# Patient Record
Sex: FEMALE | Race: BLACK OR AFRICAN AMERICAN
[De-identification: names, ages, dates, MRNs, and addresses within clinical notes are randomized per-mention and may not be internally consistent; named-entity substitution may affect disease eponyms.]

---

## 2019-03-30 ENCOUNTER — HOSPITAL ENCOUNTER (EMERGENCY)
Dept: HOSPITAL 61 - ER | Age: 74
Discharge: HOME | End: 2019-03-30
Payer: COMMERCIAL

## 2019-03-30 VITALS — BODY MASS INDEX: 26.93 KG/M2 | HEIGHT: 63 IN | WEIGHT: 152 LBS

## 2019-03-30 VITALS — DIASTOLIC BLOOD PRESSURE: 79 MMHG | SYSTOLIC BLOOD PRESSURE: 184 MMHG

## 2019-03-30 DIAGNOSIS — E11.42: Primary | ICD-10-CM

## 2019-03-30 DIAGNOSIS — M79.641: ICD-10-CM

## 2019-03-30 DIAGNOSIS — I10: ICD-10-CM

## 2019-03-30 DIAGNOSIS — Z90.49: ICD-10-CM

## 2019-03-30 LAB
ANION GAP SERPL CALC-SCNC: 12 MMOL/L (ref 6–14)
BASOPHILS # BLD AUTO: 0 X10^3/UL (ref 0–0.2)
BASOPHILS NFR BLD: 0 % (ref 0–3)
BUN SERPL-MCNC: 19 MG/DL (ref 7–20)
CALCIUM SERPL-MCNC: 8.8 MG/DL (ref 8.5–10.1)
CHLORIDE SERPL-SCNC: 102 MMOL/L (ref 98–107)
CO2 SERPL-SCNC: 29 MMOL/L (ref 21–32)
CREAT SERPL-MCNC: 1.4 MG/DL (ref 0.6–1)
EOSINOPHIL NFR BLD: 0.1 X10^3/UL (ref 0–0.7)
EOSINOPHIL NFR BLD: 2 % (ref 0–3)
ERYTHROCYTE [DISTWIDTH] IN BLOOD BY AUTOMATED COUNT: 13.8 % (ref 11.5–14.5)
GFR SERPLBLD BASED ON 1.73 SQ M-ARVRAT: 44.5 ML/MIN
GLUCOSE SERPL-MCNC: 229 MG/DL (ref 70–99)
HCT VFR BLD CALC: 35.9 % (ref 36–47)
HGB BLD-MCNC: 11.4 G/DL (ref 12–15.5)
LYMPHOCYTES # BLD: 1.6 X10^3/UL (ref 1–4.8)
LYMPHOCYTES NFR BLD AUTO: 37 % (ref 24–48)
MCH RBC QN AUTO: 27 PG (ref 25–35)
MCHC RBC AUTO-ENTMCNC: 32 G/DL (ref 31–37)
MCV RBC AUTO: 83 FL (ref 79–100)
MONO #: 0.3 X10^3/UL (ref 0–1.1)
MONOCYTES NFR BLD: 8 % (ref 0–9)
NEUT #: 2.2 X10^3UL (ref 1.8–7.7)
NEUTROPHILS NFR BLD AUTO: 52 % (ref 31–73)
PLATELET # BLD AUTO: 212 X10^3/UL (ref 140–400)
POTASSIUM SERPL-SCNC: 3.3 MMOL/L (ref 3.5–5.1)
RBC # BLD AUTO: 4.31 X10^6/UL (ref 3.5–5.4)
SODIUM SERPL-SCNC: 143 MMOL/L (ref 136–145)
WBC # BLD AUTO: 4.2 X10^3/UL (ref 4–11)

## 2019-03-30 PROCEDURE — 80048 BASIC METABOLIC PNL TOTAL CA: CPT

## 2019-03-30 PROCEDURE — 36415 COLL VENOUS BLD VENIPUNCTURE: CPT

## 2019-03-30 PROCEDURE — 73130 X-RAY EXAM OF HAND: CPT

## 2019-03-30 PROCEDURE — 99284 EMERGENCY DEPT VISIT MOD MDM: CPT

## 2019-03-30 PROCEDURE — 85025 COMPLETE CBC W/AUTO DIFF WBC: CPT

## 2019-03-30 NOTE — RAD
HAND RIGHT 3V

 

History: right hand pain and swelling

 

Comparison: None.

 

Findings:

3 views right hand are submitted. No acute fracture or dislocation is 

identified. There is fusion of the fifth distal interphalangeal joint.

 

Impression: 

 

1.  No acute osseous abnormality is identified.

 

Electronically signed by: Malcolm Moreno MD (3/30/2019 1:12 PM) [facility]

## 2019-03-30 NOTE — PHYS DOC
Past Medical History


Past Medical History:  Diabetes-Type II, Hypertension


Past Surgical History:  Cholecystectomy


Alcohol Use:  None


Drug Use:  None





Adult General


Chief Complaint


Chief Complaint:  HAND PROBLEM





HPI


HPI





Patient is a 73  year old female who presents complaining of right hand pain 

and numbness. This has been present for the past 2 weeks. She feels the pain in 

the palmar region, and numbness from the fingers distally. She has seen her 

primary care physician for this and was started on gabapentin without any 

improvement in the discomfort. She has seen what is presumably a neurologist 

since she reports that he stuck pins in her hands to see what was going on. 

Patient has had no relief with these therapies. Denies any recent trauma. 

Denies any headache or new weakness. Patient is uncertain as to what her blood 

sugar has been doing, relying on this test to be performed at her primary care 

physician office. She has run out of her valsartan for her hypertension but was 

planning on picking some up today.[]





Review of Systems


Review of Systems





Constitutional: Denies fever or chills []


Eyes: Denies change in visual acuity, redness, or eye pain []


HENT: Denies nasal congestion or sore throat []


Respiratory: Denies cough or shortness of breath []


Cardiovascular: No chest pain or palpitations[]


GI: Denies abdominal pain, nausea, vomiting, bloody stools or diarrhea []


: Denies dysuria or hematuria []


Musculoskeletal: Denies back pain, see history of present illness[]


Integument: Denies rash or skin lesions []


Neurologic: Denies headache, focal weakness, see history of present illness[]


Endocrine: Denies polyuria or polydipsia []





All other systems were reviewed and found to be within normal limits, except as 

documented in this note.





Current Medications


Current Medications





Current Medications








 Medications


  (Trade)  Dose


 Ordered  Sig/Fernanda  Start Time


 Stop Time Status Last Admin


Dose Admin


 


 Acetaminophen


  (Tylenol)  500 mg  1X  ONCE  3/30/19 13:15


 3/30/19 13:16 DC 3/30/19 13:12


500 MG


 


 Ibuprofen


  (Motrin)  200 mg  1X  ONCE  3/30/19 13:00


 3/30/19 13:01 DC 3/30/19 13:12


200 MG











Allergies


Allergies





Allergies








Coded Allergies Type Severity Reaction Last Updated Verified


 


  No Known Drug Allergies    9/8/15 No











Physical Exam


Physical Exam





Constitutional: Well developed, well nourished, no acute distress, non-toxic 

appearance. []


HENT: Normocephalic, atraumatic, bilateral external ears normal, oropharynx 

moist, no oral exudates, nose normal. []


Eyes: PERRLA, EOMI, conjunctiva normal, no discharge. [] 


Neck: Normal range of motion, no tenderness, supple, no stridor. [] 


Cardiovascular:Heart rate regular rhythm, no murmur []


Lungs & Thorax:  Bilateral breath sounds clear to auscultation []


Abdomen: Not examined. [] 


Skin: Warm, dry, no erythema, no rash. [] 


Back: No tenderness, no CVA tenderness. [] 


Extremities: Diffuse tenderness in the right hand, full active range of motion, 

FDS, FDP, and extensor mechanisms are all intact. 2 point discrimination is 

approximately 1 cm however this is symmetric with the left side. Bilaterally 

the fingertips are full to touch. No pain with axial loading of any of the 

bones.. [] 


Neurologic: Alert and oriented X 3, normal motor function, normal sensory 

function, no focal deficits noted. []


Psychologic: Affect normal, judgement normal, mood normal. []





Current Patient Data


Vital Signs





 Vital Signs








  Date Time  Temp Pulse Resp B/P (MAP) Pulse Ox O2 Delivery O2 Flow Rate FiO2


 


3/30/19 12:30 98.5 82 19 184/77 (112) 97 Room Air  





 98.5       








Lab Values





 Laboratory Tests








Test


 3/30/19


13:15


 


White Blood Count


 4.2 x10^3/uL


(4.0-11.0)


 


Red Blood Count


 4.31 x10^6/uL


(3.50-5.40)


 


Hemoglobin


 11.4 g/dL


(12.0-15.5)  L


 


Hematocrit


 35.9 %


(36.0-47.0)  L


 


Mean Corpuscular Volume


 83 fL ()





 


Mean Corpuscular Hemoglobin 27 pg (25-35)  


 


Mean Corpuscular Hemoglobin


Concent 32 g/dL


(31-37)


 


Red Cell Distribution Width


 13.8 %


(11.5-14.5)


 


Platelet Count


 212 x10^3/uL


(140-400)


 


Neutrophils (%) (Auto) 52 % (31-73)  


 


Lymphocytes (%) (Auto) 37 % (24-48)  


 


Monocytes (%) (Auto) 8 % (0-9)  


 


Eosinophils (%) (Auto) 2 % (0-3)  


 


Basophils (%) (Auto) 0 % (0-3)  


 


Neutrophils # (Auto)


 2.2 x10^3uL


(1.8-7.7)


 


Lymphocytes # (Auto)


 1.6 x10^3/uL


(1.0-4.8)


 


Monocytes # (Auto)


 0.3 x10^3/uL


(0.0-1.1)


 


Eosinophils # (Auto)


 0.1 x10^3/uL


(0.0-0.7)


 


Basophils # (Auto)


 0.0 x10^3/uL


(0.0-0.2)


 


Sodium Level


 143 mmol/L


(136-145)


 


Potassium Level


 3.3 mmol/L


(3.5-5.1)  L


 


Chloride Level


 102 mmol/L


()


 


Carbon Dioxide Level


 29 mmol/L


(21-32)


 


Anion Gap 12 (6-14)  


 


Blood Urea Nitrogen


 19 mg/dL


(7-20)


 


Creatinine


 1.4 mg/dL


(0.6-1.0)  H


 


Estimated GFR


(Cockcroft-Gault) 44.5  





 


Glucose Level


 229 mg/dL


(70-99)  H


 


Calcium Level


 8.8 mg/dL


(8.5-10.1)





 Laboratory Tests


3/30/19 13:15








 Laboratory Tests


3/30/19 13:15











EKG


EKG


[]





Radiology/Procedures


Radiology/Procedures


HAND RIGHT 3V


 


History: right hand pain and swelling


 


Comparison: None.


 


Findings:


3 views right hand are submitted. No acute fracture or dislocation is 


identified. There is fusion of the fifth distal interphalangeal joint.


 


Impression: 


 


1.  No acute osseous abnormality is identified.[]





Course & Med Decision Making


Course & Med Decision Making


Pertinent Labs and Imaging studies reviewed. (See chart for details)





ED course: Patient arrived, was placed in bed, and tolerated exam well. She is 

given medication for the discomfort which did help. After the return of the 

laboratory and imaging studies, these were discussed with the patient who 

voiced understanding. All questions were answered. Patient was discharged in 

improved condition.








Medical decision making: There does not appear to be evidence of a DVT or 

arterial thrombosis given that the discomfort is isolated to the hand and 

fingers. Believe this to be more of a complication of the diabetes, the 

peripheral neuropathy, given that it is bilateral but worse on the right side 

currently. There is no evidence of an acute neuro or vascular compromise. No 

evidence of a fracture dislocation. No evidence of diabetic ketoacidosis. No 

evidence of a significant electrolyte abnormality.[]





Dragon Disclaimer


Dragon Disclaimer


This electronic medical record was generated, in whole or in part, using a 

voice recognition dictation system.





Departure


Departure


Impression:  


 Primary Impression:  


 Peripheral neuropathy


Disposition:  01 HOME, SELF-CARE


Condition:  IMPROVED


Referrals:  


PURVI COVARRUBIAS MD (PCP)


Follow-up in 2 days


Patient Instructions:  Diabetic Neuropathy





Additional Instructions:  


Follow-up with your regular doctor in 2 days. Take your medication as 

prescribed. Return to the ER if worsening pain or any other concerns.


Scripts


Tramadol Hcl (TRAMADOL HCL) 50 Mg Tablet


50 MG PO Q6HRS PRN for PAIN, #20 TAB


   Prov: ANGIE HUTCHINSON DO         3/30/19 


Meloxicam (MELOXICAM) 7.5 Mg Tablet


7.5 MG PO DAILY, #20 TAB


   Prov: ANGIE HUTCHINSON DO         3/30/19





Problem Qualifiers








 Primary Impression:  


 Peripheral neuropathy


 Peripheral neuropathy type:  polyneuropathy, unspecified  Qualified Codes:  

G62.9 - Polyneuropathy, unspecified








ANGIE HUTCHINSON DO Mar 30, 2019 12:58

## 2020-06-06 ENCOUNTER — HOSPITAL ENCOUNTER (INPATIENT)
Dept: HOSPITAL 61 - ER | Age: 75
LOS: 2 days | Discharge: HOME | DRG: 639 | End: 2020-06-08
Attending: FAMILY MEDICINE | Admitting: FAMILY MEDICINE
Payer: COMMERCIAL

## 2020-06-06 VITALS — WEIGHT: 139.11 LBS | HEIGHT: 64 IN | BODY MASS INDEX: 23.75 KG/M2

## 2020-06-06 VITALS — DIASTOLIC BLOOD PRESSURE: 60 MMHG | SYSTOLIC BLOOD PRESSURE: 123 MMHG

## 2020-06-06 VITALS — DIASTOLIC BLOOD PRESSURE: 49 MMHG | SYSTOLIC BLOOD PRESSURE: 143 MMHG

## 2020-06-06 DIAGNOSIS — Z90.49: ICD-10-CM

## 2020-06-06 DIAGNOSIS — Z87.891: ICD-10-CM

## 2020-06-06 DIAGNOSIS — Z79.84: ICD-10-CM

## 2020-06-06 DIAGNOSIS — Z79.4: ICD-10-CM
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
## 2020-06-06 DIAGNOSIS — I12.9: ICD-10-CM

## 2020-06-06 DIAGNOSIS — E78.00: ICD-10-CM

## 2020-06-06 DIAGNOSIS — T38.3X5A: ICD-10-CM

## 2020-06-06 DIAGNOSIS — E11.649: Primary | ICD-10-CM

## 2020-06-06 DIAGNOSIS — E11.22: ICD-10-CM

## 2020-06-06 DIAGNOSIS — D64.9: ICD-10-CM

## 2020-06-06 DIAGNOSIS — N18.3: ICD-10-CM

## 2020-06-06 LAB
ALBUMIN SERPL-MCNC: 3.4 G/DL (ref 3.4–5)
ALBUMIN/GLOB SERPL: 0.9 {RATIO} (ref 1–1.7)
ALP SERPL-CCNC: 46 U/L (ref 46–116)
ALT SERPL-CCNC: 16 U/L (ref 14–59)
ANION GAP SERPL CALC-SCNC: 10 MMOL/L (ref 6–14)
AST SERPL-CCNC: 26 U/L (ref 15–37)
BASOPHILS # BLD AUTO: 0 X10^3/UL (ref 0–0.2)
BASOPHILS NFR BLD: 0 % (ref 0–3)
BILIRUB SERPL-MCNC: 0.1 MG/DL (ref 0.2–1)
BUN SERPL-MCNC: 33 MG/DL (ref 7–20)
BUN/CREAT SERPL: 22 (ref 6–20)
CALCIUM SERPL-MCNC: 8.6 MG/DL (ref 8.5–10.1)
CHLORIDE SERPL-SCNC: 100 MMOL/L (ref 98–107)
CO2 SERPL-SCNC: 30 MMOL/L (ref 21–32)
CREAT SERPL-MCNC: 1.5 MG/DL (ref 0.6–1)
EOSINOPHIL NFR BLD: 0 X10^3/UL (ref 0–0.7)
EOSINOPHIL NFR BLD: 1 % (ref 0–3)
ERYTHROCYTE [DISTWIDTH] IN BLOOD BY AUTOMATED COUNT: 14.6 % (ref 11.5–14.5)
GFR SERPLBLD BASED ON 1.73 SQ M-ARVRAT: 41 ML/MIN
GLOBULIN SER-MCNC: 3.6 G/DL (ref 2.2–3.8)
GLUCOSE SERPL-MCNC: 77 MG/DL (ref 70–99)
HCT VFR BLD CALC: 31.9 % (ref 36–47)
HGB BLD-MCNC: 10.4 G/DL (ref 12–15.5)
LYMPHOCYTES # BLD: 1.2 X10^3/UL (ref 1–4.8)
LYMPHOCYTES NFR BLD AUTO: 25 % (ref 24–48)
MCH RBC QN AUTO: 27 PG (ref 25–35)
MCHC RBC AUTO-ENTMCNC: 33 G/DL (ref 31–37)
MCV RBC AUTO: 84 FL (ref 79–100)
MONO #: 0.4 X10^3/UL (ref 0–1.1)
MONOCYTES NFR BLD: 8 % (ref 0–9)
NEUT #: 3.3 X10^3/UL (ref 1.8–7.7)
NEUTROPHILS NFR BLD AUTO: 66 % (ref 31–73)
PLATELET # BLD AUTO: 249 X10^3/UL (ref 140–400)
POTASSIUM SERPL-SCNC: 4 MMOL/L (ref 3.5–5.1)
PROT SERPL-MCNC: 7 G/DL (ref 6.4–8.2)
RBC # BLD AUTO: 3.81 X10^6/UL (ref 3.5–5.4)
SODIUM SERPL-SCNC: 140 MMOL/L (ref 136–145)
WBC # BLD AUTO: 4.9 X10^3/UL (ref 4–11)

## 2020-06-06 PROCEDURE — 85025 COMPLETE CBC W/AUTO DIFF WBC: CPT

## 2020-06-06 PROCEDURE — 36415 COLL VENOUS BLD VENIPUNCTURE: CPT

## 2020-06-06 PROCEDURE — 99285 EMERGENCY DEPT VISIT HI MDM: CPT

## 2020-06-06 PROCEDURE — 80048 BASIC METABOLIC PNL TOTAL CA: CPT

## 2020-06-06 PROCEDURE — G0378 HOSPITAL OBSERVATION PER HR: HCPCS

## 2020-06-06 PROCEDURE — 96374 THER/PROPH/DIAG INJ IV PUSH: CPT

## 2020-06-06 PROCEDURE — 82962 GLUCOSE BLOOD TEST: CPT

## 2020-06-06 PROCEDURE — 80053 COMPREHEN METABOLIC PANEL: CPT

## 2020-06-06 RX ADMIN — DEXTROSE MONOHYDRATE PRN GM: 25 INJECTION, SOLUTION INTRAVENOUS at 21:25

## 2020-06-06 RX ADMIN — DEXTROSE SCH MLS/HR: 10 SOLUTION INTRAVENOUS at 22:10

## 2020-06-06 RX ADMIN — DEXTROSE MONOHYDRATE PRN GM: 25 INJECTION, SOLUTION INTRAVENOUS at 23:27

## 2020-06-06 NOTE — NUR
RN was checking in on patient after transported from the ED to room 432. Patient was 
non-verbal at that time and was not following commands. RN checked patients glucose @ 2123 
and it was 12 at that time. RN gave patient an amp of D50 per protocol and glucose was 
rechecked at 2133 it went up to 245. MD was notified at 2145 of the blood glucose and orders 
were received at that time. RN will continue to monitor patient closely.

## 2020-06-06 NOTE — PHYS DOC
Past Medical History


Past Medical History:  Diabetes-Type II, High Cholesterol, Hypertension, Other


Additional Past Medical Histor:  NEUROPATHY


Past Surgical History:  Cholecystectomy


Smoking Status:  Former Smoker


Alcohol Use:  None


Drug Use:  None





General Adult


EDM:


Chief Complaint:  BLOOD SUGAR PROBLEM





HPI:


HPI:





Patient is a 74  year old female with history of diabetes type 2, high 

cholesterol, hypertension, who presents to the ED today complaining of her blood

sugars being up and down since last night.  Patient is a very poor historian.  

Does not know the numbers but states at some point to the blood glucose was 188 

when it went down but she does not know the number.





Review of Systems:


Review of Systems:


Constitutional:  Denies fever or chills. []


Eyes:  Denies change in visual acuity. []


HENT:  Denies nasal congestion or sore throat. [] 


Respiratory:  Denies cough or shortness of breath. [] 


Cardiovascular:  Denies chest pain or edema. [] 


GI:  Denies abdominal pain, nausea, vomiting, bloody stools or diarrhea. [] 


:  Denies dysuria. [] 


Musculoskeletal:  Denies back pain or joint pain. [] 


Integument:  Denies rash. [] 


Neurologic:  Denies headache, focal weakness or sensory changes. [] 


Endocrine: Reports hypoglycemia


Lymphatic:  Denies swollen glands. [] 


Psychiatric:  Denies depression or anxiety. []





Heart Score:


Risk Factors:


Risk Factors:  DM, Current or recent (<one month) smoker, HTN, HLP, family 

history of CAD, obesity.


Risk Scores:


Score 0 - 3:  2.5% MACE over next 6 weeks - Discharge Home


Score 4 - 6:  20.3% MACE over next 6 weeks - Admit for Clinical Observation


Score 7 - 10:  72.7% MACE over next 6 weeks - Early Invasive Strategies





Current Medications:





Current Medications








 Medications


  (Trade)  Dose


 Ordered  Sig/Fernanda  Start Time


 Stop Time Status Last Admin


Dose Admin


 


 Dextrose


  (Dextrose


 50%-Water Syringe)  25 gm  1X  ONCE  6/6/20 17:00


 6/6/20 17:01 DC 6/6/20 16:50


25 GM











Allergies:


Allergies:





Allergies








Coded Allergies Type Severity Reaction Last Updated Verified


 


  No Known Drug Allergies    9/8/15 No











Physical Exam:


PE:





Constitutional: Well developed, well nourished, no acute distress, non-toxic 

appearance. []


HENT: Normocephalic, atraumatic, bilateral external ears normal, oropharynx 

moist, no oral exudates, nose normal. []


Eyes: PERRLA, EOMI, conjunctiva normal, no discharge. [] 


Neck: Normal range of motion, no tenderness, supple, no stridor. [] 


Cardiovascular:Heart rate regular rhythm, no murmur []


Lungs & Thorax:  Bilateral breath sounds clear to auscultation []


Abdomen: Bowel sounds normal, soft, no tenderness, no masses, no pulsatile 

masses. [] 


Skin: Warm, dry, no erythema, no rash. [] 


Back: No tenderness, no CVA tenderness. [] 


Extremities: No tenderness, no cyanosis, no clubbing, ROM intact, no edema. [] 


Neurologic: Alert and oriented X 3, normal motor function, normal sensory 

function, no focal deficits noted. []


Psychologic: Affect normal, judgement normal, mood normal. []





Current Patient Data:


Labs:





                                Laboratory Tests








Test


 6/6/20


15:16 6/6/20


15:20 6/6/20


16:48 6/6/20


17:04


 


Glucose (Fingerstick)


 84 mg/dL


(70-99) 


 20 mg/dL


(70-99)  *L 164 mg/dL


(70-99)  H


 


White Blood Count


 


 4.9 x10^3/uL


(4.0-11.0) 


 





 


Red Blood Count


 


 3.81 x10^6/uL


(3.50-5.40) 


 





 


Hemoglobin


 


 10.4 g/dL


(12.0-15.5)  L 


 





 


Hematocrit


 


 31.9 %


(36.0-47.0)  L 


 





 


Mean Corpuscular Volume


 


 84 fL ()


 


 





 


Mean Corpuscular Hemoglobin  27 pg (25-35)    


 


Mean Corpuscular Hemoglobin


Concent 


 33 g/dL


(31-37) 


 





 


Red Cell Distribution Width


 


 14.6 %


(11.5-14.5)  H 


 





 


Platelet Count


 


 249 x10^3/uL


(140-400) 


 





 


Neutrophils (%) (Auto)  66 % (31-73)    


 


Lymphocytes (%) (Auto)  25 % (24-48)    


 


Monocytes (%) (Auto)  8 % (0-9)    


 


Eosinophils (%) (Auto)  1 % (0-3)    


 


Basophils (%) (Auto)  0 % (0-3)    


 


Neutrophils # (Auto)


 


 3.3 x10^3/uL


(1.8-7.7) 


 





 


Lymphocytes # (Auto)


 


 1.2 x10^3/uL


(1.0-4.8) 


 





 


Monocytes # (Auto)


 


 0.4 x10^3/uL


(0.0-1.1) 


 





 


Eosinophils # (Auto)


 


 0.0 x10^3/uL


(0.0-0.7) 


 





 


Basophils # (Auto)


 


 0.0 x10^3/uL


(0.0-0.2) 


 





 


Sodium Level


 


 140 mmol/L


(136-145) 


 





 


Potassium Level


 


 4.0 mmol/L


(3.5-5.1) 


 





 


Chloride Level


 


 100 mmol/L


() 


 





 


Carbon Dioxide Level


 


 30 mmol/L


(21-32) 


 





 


Anion Gap  10 (6-14)    


 


Blood Urea Nitrogen


 


 33 mg/dL


(7-20)  H 


 





 


Creatinine


 


 1.5 mg/dL


(0.6-1.0)  H 


 





 


Estimated GFR


(Cockcroft-Gault) 


 41.0  


 


 





 


BUN/Creatinine Ratio  22 (6-20)  H  


 


Glucose Level


 


 77 mg/dL


(70-99) 


 





 


Calcium Level


 


 8.6 mg/dL


(8.5-10.1) 


 





 


Total Bilirubin


 


 0.1 mg/dL


(0.2-1.0)  L 


 





 


Aspartate Amino Transferase


(AST) 


 26 U/L (15-37)


 


 





 


Alanine Aminotransferase (ALT)


 


 16 U/L (14-59)


 


 





 


Alkaline Phosphatase


 


 46 U/L


() 


 





 


Total Protein


 


 7.0 g/dL


(6.4-8.2) 


 





 


Albumin


 


 3.4 g/dL


(3.4-5.0) 


 





 


Albumin/Globulin Ratio


 


 0.9 (1.0-1.7)


L 


 








                                Laboratory Tests


6/6/20 15:20








                                Laboratory Tests


6/6/20 15:20








Vital Signs:





                                   Vital Signs








  Date Time  Temp Pulse Resp B/P (MAP) Pulse Ox O2 Delivery O2 Flow Rate FiO2


 


6/6/20 19:00  88 18 158/67 (97) 95 Room Air  


 


6/6/20 15:10 97.8       





 97.8       











EKG:


EKG:


[]





Radiology/Procedures:


Radiology/Procedures:


[]





Course & Med Decision Making:


Course & Med Decision Making


Pertinent Labs and Imaging studies reviewed. (See chart for details)





This is a 74-year-old female patient presenting to the ED today complaining of 

her blood glucose going up and down since last night.  Patient is a poor 

historian.  Does not have exact numbers.  She does not know what insulin or 

diabetic medicine she is on.





Blood glucose was 84 on arrival to the ED, it went down to 20, she was given 

D50.  It went up to 164.





Spoke with Dr. Wilkins patient was admitted.





Dragon Disclaimer:


Dragon Disclaimer:


This electronic medical record was generated, in whole or in part, using a voice

 recognition dictation system.





Departure


Departure


Impression:  


   Primary Impression:  


   Hypoglycemia


Disposition:  09 ADMITTED AS INPATIENT


Condition:  STABLE


Referrals:  


PURVI WILKINS MD (PCP)





Justicifation of Admission Dx:


Justifications for Admission:


Justification of Admission Dx:  Yes


Comments:


hypoglycemia











JUANJO BAH APRN               Jun 6, 2020 19:53

## 2020-06-06 NOTE — NUR
RN checked patients glucose at 2320 and was 50. RN administered half an ampule of D50 at 
that time. Blood glucose was rechecked at 2345 glucose was then 140. MD paged at 2350 and 
then again at 0013 regarding the glucose. MD called back at 0025 and gave orders to check 
patients glucose hourly and if glucose less than 60 to give half an ampule of D50. RN will 
continue to monitor patient closely.

## 2020-06-07 VITALS — SYSTOLIC BLOOD PRESSURE: 110 MMHG | DIASTOLIC BLOOD PRESSURE: 41 MMHG

## 2020-06-07 VITALS — DIASTOLIC BLOOD PRESSURE: 46 MMHG | SYSTOLIC BLOOD PRESSURE: 102 MMHG

## 2020-06-07 VITALS — SYSTOLIC BLOOD PRESSURE: 120 MMHG | DIASTOLIC BLOOD PRESSURE: 54 MMHG

## 2020-06-07 VITALS — SYSTOLIC BLOOD PRESSURE: 142 MMHG | DIASTOLIC BLOOD PRESSURE: 50 MMHG

## 2020-06-07 VITALS — DIASTOLIC BLOOD PRESSURE: 55 MMHG | SYSTOLIC BLOOD PRESSURE: 115 MMHG

## 2020-06-07 VITALS — SYSTOLIC BLOOD PRESSURE: 102 MMHG | DIASTOLIC BLOOD PRESSURE: 54 MMHG

## 2020-06-07 LAB
ANION GAP SERPL CALC-SCNC: 7 MMOL/L (ref 6–14)
BUN SERPL-MCNC: 22 MG/DL (ref 7–20)
CALCIUM SERPL-MCNC: 8.3 MG/DL (ref 8.5–10.1)
CHLORIDE SERPL-SCNC: 101 MMOL/L (ref 98–107)
CO2 SERPL-SCNC: 30 MMOL/L (ref 21–32)
CREAT SERPL-MCNC: 1.3 MG/DL (ref 0.6–1)
GFR SERPLBLD BASED ON 1.73 SQ M-ARVRAT: 48.3 ML/MIN
GLUCOSE SERPL-MCNC: 65 MG/DL (ref 70–99)
POTASSIUM SERPL-SCNC: 3.6 MMOL/L (ref 3.5–5.1)
SODIUM SERPL-SCNC: 138 MMOL/L (ref 136–145)

## 2020-06-07 RX ADMIN — DEXTROSE SCH MLS/HR: 10 SOLUTION INTRAVENOUS at 20:47

## 2020-06-07 RX ADMIN — DEXTROSE SCH MLS/HR: 10 SOLUTION INTRAVENOUS at 18:14

## 2020-06-07 RX ADMIN — DEXTROSE SCH MLS/HR: 10 SOLUTION INTRAVENOUS at 07:37

## 2020-06-07 RX ADMIN — DEXTROSE MONOHYDRATE PRN GM: 25 INJECTION, SOLUTION INTRAVENOUS at 03:57

## 2020-06-07 RX ADMIN — DEXTROSE MONOHYDRATE PRN GM: 25 INJECTION, SOLUTION INTRAVENOUS at 05:17

## 2020-06-07 NOTE — HP
ADMIT DATE:  06/07/2020



CHIEF COMPLAINT:  Low blood sugar.



HISTORY OF PRESENT ILLNESS:  A 74-year-old black female with a history of mild

type 2 diabetes mellitus, takes glimepiride 1 mg as half of the 2 mg tablet

daily for treatment.  Her last A1c was about 6.5 3-4 months ago.  She took her

usual dose of half a tab on the morning prior to admission and had been eating

normally, but apparently had confusion, episodes of weakness and found to have a

low blood sugar of 20.  Since then, she has been on D5 and D10, has required 3

separate infusions of D50 to raise her sugar back up the levels.  She has not

had D10 for about 4 hours now and feels better.



MEDICATIONS:  Other meds documented in the chart.



ALLERGIES:  No allergies are known.



SOCIAL HISTORY:  Lives with family.  She is , nonsmoker, nondrinker.



FAMILY HISTORY:  Unremarkable.



OBJECTIVE:

ENT:  All within normal limits.

NECK:  No masses, nodes or bruits.

LUNGS:  Clear.

CARDIOVASCULAR:  Regular rate.  No tachycardia or murmur.

ABDOMEN:  Benign.

EXTREMITIES:  Unremarkable.

NEUROLOGIC:  Alert, appropriate, physiologic, nonfocal.  She has her usual

mildly altered affect from psychiatric disorder.



ASSESSMENT:  Persistent hypoglycemia secondary to glimepiride use.  Otherwise,

medically stable.



PLAN:  Continue D10 infusions and periodic D50 infusions until blood sugars

recover.  She and family are advised to discard her remaining glimepiride, she

will never take them ____ or sulfonylureas again as risk greater than benefit.

 



______________________________

PURVI COVARRUBIAS MD



DR:  RADHA/ian  JOB#:  [id number] / [id number]

DD:  06/07/2020 10:18  DT:  06/07/2020 10:37

## 2020-06-08 VITALS — DIASTOLIC BLOOD PRESSURE: 55 MMHG | SYSTOLIC BLOOD PRESSURE: 123 MMHG

## 2020-06-08 VITALS — DIASTOLIC BLOOD PRESSURE: 51 MMHG | SYSTOLIC BLOOD PRESSURE: 105 MMHG

## 2020-06-08 VITALS — DIASTOLIC BLOOD PRESSURE: 60 MMHG | SYSTOLIC BLOOD PRESSURE: 142 MMHG

## 2020-06-08 NOTE — PDOC
Provider Note


Provider Note


[id number]





Justicifation of Admission Dx:


Justifications for Admission:


Justification of Admission Dx:  Yes


Altered Mental Status:  Altered Mental Status


Diabetic Urgency:  Diabetic Urgency











PURVI COVARRUBIAS MD              Jun 8, 2020 08:23

## 2020-06-08 NOTE — DS
DATE OF DISCHARGE:  06/08/2020



HOSPITAL SUMMARY:  A 74-year-old white female who takes low-dose glimepiride

along with other meds for diabetes, came in with recurrent hypoglycemia.  Her

blood sugar was in the 20s a couple times in the ER and required amps of D50

periodically throughout the hospital stay.  BMP showed a mildly elevated

creatinine of 1.5, GFR 41 and with hydration, GFR improved to 48.  The CBC

showed mild anemia, normocytic, with hemoglobin 10.4.  She received IV D10 and

no oral diabetic meds were given and blood sugars have been stable now for more

than 12 hours.  She is eating and drinking and not able to be followed as an

outpatient as her last dose of glimepiride was approximately 48 hours ago.



FINAL DIAGNOSES:

1.  Glimepiride/sulfonylurea-induced hypoglycemia.

2.  Chronic kidney disease 3, stable.

3.  Normocytic anemia, etiology undetermined.



OPERATIONS, PROCEDURES, COMPLICATIONS, CONSULTATIONS:  None.



DISPOSITION:  All meds remain the same except she will discontinue glimepiride

permanently.  May consider other oral agent if A1c goes higher, but her A1c has

been relatively low despite a lower dose of glimepiride used.  Normal diabetic

diet.  Good hydration.  We will follow up in the office and schedule next

appointment and follow the CKD and mild anemia as well, both of which are

preexisting.

 



______________________________

PURVI COVARRUBIAS MD



DR:  RADHA/ian  JOB#:  [id number] / [id number]

DD:  06/08/2020 08:22  DT:  06/08/2020 09:07